# Patient Record
Sex: MALE | Race: WHITE | HISPANIC OR LATINO | URBAN - METROPOLITAN AREA
[De-identification: names, ages, dates, MRNs, and addresses within clinical notes are randomized per-mention and may not be internally consistent; named-entity substitution may affect disease eponyms.]

---

## 2024-11-05 ENCOUNTER — LAB OUTSIDE AN ENCOUNTER (OUTPATIENT)
Dept: URBAN - METROPOLITAN AREA CLINIC 98 | Facility: CLINIC | Age: 49
End: 2024-11-05

## 2024-11-05 ENCOUNTER — OFFICE VISIT (OUTPATIENT)
Dept: URBAN - METROPOLITAN AREA CLINIC 98 | Facility: CLINIC | Age: 49
End: 2024-11-05
Payer: COMMERCIAL

## 2024-11-05 VITALS
SYSTOLIC BLOOD PRESSURE: 116 MMHG | TEMPERATURE: 97.2 F | HEIGHT: 66 IN | WEIGHT: 234.2 LBS | DIASTOLIC BLOOD PRESSURE: 73 MMHG | HEART RATE: 72 BPM | BODY MASS INDEX: 37.64 KG/M2

## 2024-11-05 DIAGNOSIS — Z12.11 SCREENING FOR COLON CANCER: ICD-10-CM

## 2024-11-05 DIAGNOSIS — R31.9 HEMATURIA, UNSPECIFIED TYPE: ICD-10-CM

## 2024-11-05 DIAGNOSIS — R10.30 LOWER ABDOMINAL PAIN: ICD-10-CM

## 2024-11-05 PROCEDURE — 99203 OFFICE O/P NEW LOW 30 MIN: CPT

## 2024-11-05 NOTE — HPI-TODAY'S VISIT:
11/5/2024- Sylvia Oconnor, NP - 48 yo male new patient here with complaints of lower abdominal pain - PCP Dr. Haider Covarrubias - Referred to Dr. Barth for colonoscopy -  Sandy [id number] - [place] -Spouse here for OV today - Ryann - No prior colonoscopy - No family history colon cancer/polyps - Lower abdominal pain started 1 month ago, sharp pain, comes with bloating at night; "sometimes when urinates penis hurts" PCP did urinalysis - blood in urine;  Scheduled for CT scan - Bm 1-2 per day - Stools soft to hard - Denies bright red blood, melena  - No nausea, vomiting, heartburn, dysphagia - No unintentional weight loss

## 2024-11-22 ENCOUNTER — DASHBOARD ENCOUNTERS (OUTPATIENT)
Age: 49
End: 2024-11-22

## 2024-11-22 ENCOUNTER — OFFICE VISIT (OUTPATIENT)
Dept: URBAN - METROPOLITAN AREA CLINIC 98 | Facility: CLINIC | Age: 49
End: 2024-11-22
Payer: COMMERCIAL

## 2024-11-22 VITALS
HEIGHT: 66 IN | SYSTOLIC BLOOD PRESSURE: 113 MMHG | TEMPERATURE: 97 F | WEIGHT: 230 LBS | BODY MASS INDEX: 36.96 KG/M2 | HEART RATE: 81 BPM | DIASTOLIC BLOOD PRESSURE: 78 MMHG

## 2024-11-22 DIAGNOSIS — R31.9 HEMATURIA, UNSPECIFIED TYPE: ICD-10-CM

## 2024-11-22 DIAGNOSIS — Z12.11 SCREENING FOR COLON CANCER: ICD-10-CM

## 2024-11-22 DIAGNOSIS — R10.30 LOWER ABDOMINAL PAIN: ICD-10-CM

## 2024-11-22 PROCEDURE — 99213 OFFICE O/P EST LOW 20 MIN: CPT

## 2024-11-22 RX ORDER — IBUPROFEN 800 MG/1
TAKE 1 TABLET BY MOUTH EVERY 8 HOURS TABLET ORAL
Qty: 15 EACH | Refills: 0 | Status: ON HOLD | COMMUNITY

## 2024-11-22 RX ORDER — AMOXICILLIN 500 MG/1
TAKE 1 TABLET BY MOUTH EVERY 8 HOURS TABLET, FILM COATED ORAL
Qty: 15 EACH | Refills: 0 | Status: ON HOLD | COMMUNITY

## 2024-11-22 NOTE — HPI-TODAY'S VISIT:
11/5/2024- Sylvia Oconnor, NP - 48 yo male new patient here with complaints of lower abdominal pain - PCP Dr. Haider Covarrubias - Referred to Dr. Barth for colonoscopy -  Sandy [id number] - Filipino -Spouse here for OV today - Ryann - No prior colonoscopy - No family history colon cancer/polyps - Lower abdominal pain started 1 month ago, sharp pain, comes with bloating at night; "sometimes when urinates penis hurts" PCP did urinalysis - blood in urine;  Scheduled for CT scan - Bm 1-2 per day - Stools soft to hard - Denies bright red blood, melena  - No nausea, vomiting, heartburn, dysphagia - No unintentional weight loss  11/22/24- Sylvia Oconnor NP - 48 yo male patient here with complaints of LLQ pain - No PCP -  bebeto Wray [id number] - Filipino - Ryann spouse here today - Has the colonoscopy on 12/2 with DR. Barth - Went to ER [facility] on 11/16- only finding was fat containing umbilical hernia with no inflammatory changes noted-CT scan. Labs - gross hematuria (no renal stone or abnormality noted)    - In ER patient was instructed ot follow-up with GI and surgery to repair the hernia - Has not consulted with surgery yet - Having a lot of discomfort with standing and the pain radiates down his leg

## 2024-11-22 NOTE — PHYSICAL EXAM GASTROINTESTINAL
Abdomen , soft, TENDER WITH PALPATION LLQ, nondistended , no guarding or rigidity , no masses palpable , normal bowel sounds , Liver and Spleen,  no hepatosplenomegaly , liver nontender

## 2024-11-26 ENCOUNTER — OFFICE VISIT (OUTPATIENT)
Dept: URBAN - METROPOLITAN AREA SURGERY CENTER 18 | Facility: SURGERY CENTER | Age: 49
End: 2024-11-26

## 2024-11-26 RX ORDER — IBUPROFEN 800 MG/1
TAKE 1 TABLET BY MOUTH EVERY 8 HOURS TABLET ORAL
Qty: 15 EACH | Refills: 0 | Status: ON HOLD | COMMUNITY

## 2024-11-26 RX ORDER — AMOXICILLIN 500 MG/1
TAKE 1 TABLET BY MOUTH EVERY 8 HOURS TABLET, FILM COATED ORAL
Qty: 15 EACH | Refills: 0 | Status: ON HOLD | COMMUNITY

## 2024-12-02 ENCOUNTER — OFFICE VISIT (OUTPATIENT)
Dept: URBAN - METROPOLITAN AREA SURGERY CENTER 18 | Facility: SURGERY CENTER | Age: 49
End: 2024-12-02